# Patient Record
Sex: FEMALE | Race: BLACK OR AFRICAN AMERICAN | Employment: FULL TIME | URBAN - METROPOLITAN AREA
[De-identification: names, ages, dates, MRNs, and addresses within clinical notes are randomized per-mention and may not be internally consistent; named-entity substitution may affect disease eponyms.]

---

## 2019-01-10 ENCOUNTER — HOSPITAL ENCOUNTER (OUTPATIENT)
Dept: LAB | Age: 24
Discharge: HOME | End: 2019-01-10

## 2019-01-10 ENCOUNTER — HOSPITAL ENCOUNTER (EMERGENCY)
Age: 24
Discharge: HOME | End: 2019-01-10
Attending: EMERGENCY MEDICINE
Payer: COMMERCIAL

## 2019-01-10 ENCOUNTER — OFFICE VISIT (OUTPATIENT)
Dept: ORTHOPEDIC SURGERY | Facility: CLINIC | Age: 24
End: 2019-01-10

## 2019-01-10 VITALS
DIASTOLIC BLOOD PRESSURE: 76 MMHG | TEMPERATURE: 99.6 F | SYSTOLIC BLOOD PRESSURE: 114 MMHG | HEART RATE: 87 BPM | OXYGEN SATURATION: 100 % | RESPIRATION RATE: 18 BRPM

## 2019-01-10 VITALS
SYSTOLIC BLOOD PRESSURE: 107 MMHG | DIASTOLIC BLOOD PRESSURE: 70 MMHG | BODY MASS INDEX: 27.18 KG/M2 | RESPIRATION RATE: 16 BRPM | HEIGHT: 64 IN | OXYGEN SATURATION: 100 % | TEMPERATURE: 96.5 F | HEART RATE: 84 BPM | WEIGHT: 159.2 LBS

## 2019-01-10 DIAGNOSIS — M25.461 EFFUSION OF RIGHT KNEE: Primary | ICD-10-CM

## 2019-01-10 DIAGNOSIS — M25.461 EFFUSION OF RIGHT KNEE: ICD-10-CM

## 2019-01-10 DIAGNOSIS — M65.9 SYNOVITIS OF RIGHT KNEE: ICD-10-CM

## 2019-01-10 DIAGNOSIS — M25.561 RIGHT KNEE PAIN, UNSPECIFIED CHRONICITY: ICD-10-CM

## 2019-01-10 PROCEDURE — 89060 EXAM SYNOVIAL FLUID CRYSTALS: CPT

## 2019-01-10 PROCEDURE — 99001 SPECIMEN HANDLING PT-LAB: CPT

## 2019-01-10 PROCEDURE — 99283 EMERGENCY DEPT VISIT LOW MDM: CPT

## 2019-01-10 PROCEDURE — 74011250637 HC RX REV CODE- 250/637: Performed by: EMERGENCY MEDICINE

## 2019-01-10 RX ORDER — HYDROCODONE BITARTRATE AND ACETAMINOPHEN 5; 325 MG/1; MG/1
1 TABLET ORAL
Status: COMPLETED | OUTPATIENT
Start: 2019-01-10 | End: 2019-01-10

## 2019-01-10 RX ORDER — IBUPROFEN 400 MG/1
800 TABLET ORAL
Status: DISCONTINUED | OUTPATIENT
Start: 2019-01-10 | End: 2019-01-10

## 2019-01-10 RX ORDER — IBUPROFEN 800 MG/1
800 TABLET ORAL EVERY 8 HOURS
Qty: 15 TAB | Refills: 0 | Status: SHIPPED | OUTPATIENT
Start: 2019-01-10 | End: 2019-01-15

## 2019-01-10 RX ORDER — HYDROCODONE BITARTRATE AND ACETAMINOPHEN 5; 325 MG/1; MG/1
TABLET ORAL
COMMUNITY
End: 2019-03-06 | Stop reason: ALTCHOICE

## 2019-01-10 RX ORDER — BUPIVACAINE HYDROCHLORIDE 2.5 MG/ML
4 INJECTION, SOLUTION EPIDURAL; INFILTRATION; INTRACAUDAL ONCE
Qty: 4 ML | Refills: 0
Start: 2019-01-10 | End: 2019-01-10

## 2019-01-10 RX ORDER — HYDROCODONE BITARTRATE AND ACETAMINOPHEN 5; 325 MG/1; MG/1
TABLET ORAL
Qty: 6 TAB | Refills: 0 | Status: SHIPPED | OUTPATIENT
Start: 2019-01-10 | End: 2019-03-06 | Stop reason: ALTCHOICE

## 2019-01-10 RX ORDER — IBUPROFEN 800 MG/1
TABLET ORAL
COMMUNITY

## 2019-01-10 RX ORDER — BETAMETHASONE SODIUM PHOSPHATE AND BETAMETHASONE ACETATE 3; 3 MG/ML; MG/ML
6 INJECTION, SUSPENSION INTRA-ARTICULAR; INTRALESIONAL; INTRAMUSCULAR; SOFT TISSUE ONCE
Qty: 0.5 ML | Refills: 0
Start: 2019-01-10 | End: 2019-01-10

## 2019-01-10 RX ADMIN — IBUPROFEN 800 MG: 400 TABLET, FILM COATED ORAL at 07:37

## 2019-01-10 RX ADMIN — HYDROCODONE BITARTRATE AND ACETAMINOPHEN 1 TABLET: 5; 325 TABLET ORAL at 07:38

## 2019-01-10 NOTE — LETTER
NOTIFICATION RETURN TO WORK / SCHOOL 
 
1/10/2019 7:44 AM 
 
Ms. Claudio Whaley [address] [facility] [address] To Whom It May Concern: 
 
Claudio Whaley is currently under the care of [facility] EMERGENCY DEPT. She will return to work/school on: 1/13/19. If there are questions or concerns please have the patient contact our office.  
 
 
 
Sincerely, 
 
 
Larry Varma PA-C

## 2019-01-10 NOTE — ED NOTES
Report received from Veronica Schwarz RN. Assumed care of the patient. Received patient resting on stretcher, awake, alert, oriented X 3. Introduced myself as her primary nurse. Explanation of plan of care provided to the patient.

## 2019-01-10 NOTE — ED NOTES
Discharge instructions given to patient by Fabio Ruiz RN. Patient discharged home ambulatory, in stable condition.

## 2019-01-10 NOTE — ED PROVIDER NOTES
EMERGENCY DEPARTMENT HISTORY AND PHYSICAL EXAM 
 
Date: 1/10/2019 Patient Name: Radha Lynos History of Presenting Illness No chief complaint on file. History Provided By: Patient and auntie Chief Complaint: painful knee swelling Duration: 1 Days Timing:  Acute Location: right Quality: Aching and Pressure Severity: Moderate Modifying Factors: h/o same Associated Symptoms: denies any other associated signs or symptoms Additional History (Context): Radha Lyons is a 20 y.o. female with No significant past medical history who presents with swelling to her right knee since yesterday. This has happened 3x in past 6mos. Just moved to area to be w/family. Doesn't have PCP or orthopedic. Denies trauma, h/o gout. Has ace bandages to compress. Applying ice at home. PCP: None Current Facility-Administered Medications Medication Dose Route Frequency Provider Last Rate Last Dose  
 HYDROcodone-acetaminophen (NORCO) 5-325 mg per tablet 1 Tab  1 Tab Oral NOW Mi Belcher PA      
 ibuprofen (MOTRIN) tablet 800 mg  800 mg Oral NOW Mi Belcher PA Current Outpatient Medications Medication Sig Dispense Refill  ibuprofen (MOTRIN) 800 mg tablet Take 1 Tab by mouth every eight (8) hours for 5 days. 15 Tab 0  
 HYDROcodone-acetaminophen (NORCO) 5-325 mg per tablet Take 1-2 tablets PO every 4-6 hours as needed for pain control. If over the counter ibuprofen or acetaminophen was suggested, then only take the vicodin for pain not well controlled with the over the counter medication. 6 Tab 0 Past History Past Medical History: No past medical history on file. Past Surgical History: No past surgical history on file. Family History: No family history on file. Social History: 
Social History Tobacco Use  Smoking status: Not on file Substance Use Topics  Alcohol use: Not on file  Drug use: Not on file Allergies: Allergies not on file Review of Systems Review of Systems Constitutional: Negative for fever. Musculoskeletal: Positive for arthralgias and joint swelling. Neurological: Negative for weakness and numbness. All other systems reviewed and are negative. All Other Systems Negative Physical Exam  
There were no vitals filed for this visit. Physical Exam  
Constitutional: She is oriented to person, place, and time. She appears well-developed. HENT:  
Head: Normocephalic and atraumatic. Eyes: Pupils are equal, round, and reactive to light. Neck: No JVD present. No tracheal deviation present. No thyromegaly present. Cardiovascular: Normal rate, regular rhythm and normal heart sounds. Exam reveals no gallop and no friction rub. No murmur heard. Pulmonary/Chest: Effort normal and breath sounds normal. No stridor. No respiratory distress. She has no wheezes. She has no rales. She exhibits no tenderness. Abdominal: Soft. She exhibits no distension and no mass. There is no tenderness. There is no rebound and no guarding. Musculoskeletal: She exhibits edema and tenderness. Right knee: large tender effusion. No warmth or redness. Lymphadenopathy:  
  She has no cervical adenopathy. Neurological: She is alert and oriented to person, place, and time. Skin: Skin is warm and dry. No rash noted. No erythema. No pallor. Psychiatric: She has a normal mood and affect. Her behavior is normal. Thought content normal.  
Nursing note and vitals reviewed. Diagnostic Study Results Labs - No results found for this or any previous visit (from the past 12 hour(s)). Radiologic Studies - No orders to display CT Results  (Last 48 hours) None CXR Results  (Last 48 hours) None Medical Decision Making I am the first provider for this patient.  
 
I reviewed the vital signs, available nursing notes, past medical history, past surgical history, family history and social history. Vital Signs-Reviewed the patient's vital signs. Records Reviewed: Nursing Notes Procedures: 
Procedures Provider Notes (Medical Decision Making): treat pain; not septic jt appearing. H/o similar effusion now x3. Refer to ortho. MED RECONCILIATION: 
Current Facility-Administered Medications Medication Dose Route Frequency  HYDROcodone-acetaminophen (NORCO) 5-325 mg per tablet 1 Tab  1 Tab Oral NOW  ibuprofen (MOTRIN) tablet 800 mg  800 mg Oral NOW Current Outpatient Medications Medication Sig  ibuprofen (MOTRIN) 800 mg tablet Take 1 Tab by mouth every eight (8) hours for 5 days.  HYDROcodone-acetaminophen (NORCO) 5-325 mg per tablet Take 1-2 tablets PO every 4-6 hours as needed for pain control. If over the counter ibuprofen or acetaminophen was suggested, then only take the vicodin for pain not well controlled with the over the counter medication. Disposition: 
home DISCHARGE NOTE:  
7:19 AM 
 
Pt has been reexamined. Patient has no new complaints, changes, or physical findings. Care plan outlined and precautions discussed. Results of exam were reviewed with the patient. All medications were reviewed with the patient; will d/c home with ibuprofen, vicodin. All of pt's questions and concerns were addressed. Patient was instructed and agrees to follow up with ortho, as well as to return to the ED upon further deterioration. Patient is ready to go home. Follow-up Information Follow up With Specialties Details Why Contact Info Sukhwinder Rodrigues MD Orthopedic Surgery Schedule an appointment as soon as possible for a visit today  [address] [address] 
[phone] [address] [employer].  Schedule an appointment as soon as possible for a visit today  [address] [place] [address], [address] [facility] 
[phone] [facility] EMERGENCY DEPT Emergency Medicine   [address] [address] 
[phone] [facility] EMERGENCY DEPT Emergency Medicine  If symptoms worsen return immediately [address] Current Discharge Medication List  
  
START taking these medications Details  
ibuprofen (MOTRIN) 800 mg tablet Take 1 Tab by mouth every eight (8) hours for 5 days. Qty: 15 Tab, Refills: 0 HYDROcodone-acetaminophen (NORCO) 5-325 mg per tablet Take 1-2 tablets PO every 4-6 hours as needed for pain control. If over the counter ibuprofen or acetaminophen was suggested, then only take the vicodin for pain not well controlled with the over the counter medication. Qty: 6 Tab, Refills: 0 Associated Diagnoses: Effusion of right knee Diagnosis Clinical Impression: 1. Effusion of right knee

## 2019-01-10 NOTE — LETTER
NOTIFICATION RETURN TO WORK / SCHOOL 
 
1/10/2019 3:43 PM 
 
Ms. Radha Howell [address] [address] To Whom It May Concern: 
 
Radha Howell is currently under the care of [address] [place] [address]. She will return to full duty work/school on: Tuesday, January 15, 2019. If there are questions or concerns please have the patient contact our office.  
 
 
 
Sincerely, 
 
 
 
Linn Moffett MD

## 2019-01-10 NOTE — PROGRESS NOTES
Patient: Harlow Goltz                MRN: [id number]       SSN: [id number] YOB: 1995        AGE: 20 y.o. SEX: female PCP: No primary care provider on file. 01/10/19 Chief Complaint Patient presents with  Knee Pain Right HISTORY:  Harlow Goltz is a 20 y.o. female who is seen for right knee pain. She has been experiencing decreasing knee pain for the past 3 months. . She reports no h/o injury. She experienced severe sudden swelling of her right knee. She notes pain with standing and walking. She has pain ascending/descending stairs. She reports difficulty with everyday activities. She has difficulty walking and has been using a four point cane. She was seen at [facility] this morning 5am for severe right knee pain and swelling. This is the third episode of acute right knee swelling in the past year. Her swelling usually decreases within a few days. No known family h/o gout but she doesn't know her biological parents' medical history well. Pain Assessment  1/10/2019 Location of Pain Knee Location Modifiers Right Severity of Pain 6 Quality of Pain Throbbing;Aching Duration of Pain Persistent Frequency of Pain Constant Aggravating Factors Walking;Standing Limiting Behavior Yes Relieving Factors Ice;Elevation Result of Injury No  
 
Occupation, etc:  Ms. Charisse Liu is a  for [employer]. She previously lived in [place] and in [place]. She lives in [facility] with her aunt and uncle. Her biological dad lives in [place] and her biological mom lives in [place]. Her aunt had guardianship of her growing up. Current weight is 159 pounds. She is 5'4\" tall. No results found for: HBA1C, HGBE8, AJN1HZCP, [id number], [id number] Weight Metrics 1/10/2019 Weight 159 lb 3.2 oz  
BMI 27.33 kg/m2 There is no problem list on file for this patient. REVIEW OF SYSTEMS: All Below are Negative except: See HPI Constitutional: negative for fever, chills, and weight loss. Cardiovascular: negative for chest pain, claudication, leg swelling, SOB, RYAN Gastrointestinal: Negative for pain, N/V/C/D, Blood in stool or urine, dysuria,  hematuria, incontinence, pelvic pain. Musculoskeletal: See HPI Neurological: Negative for dizziness and weakness. Negative for headaches, Visual changes, confusion, seizures Phychiatric/Behavioral: Negative for depression, memory loss, substance  abuse. Extremities: Negative for hair changes, rash, or skin lesion changes. Hematologic: Negative for bleeding problems, bruising, pallor or swollen lymph  nodes Peripheral Vascular: No calf pain, no circulation deficits. Social History Socioeconomic History  Marital status: UNKNOWN Spouse name: Not on file  Number of children: Not on file  Years of education: Not on file  Highest education level: Not on file Social Needs  Financial resource strain: Not on file  Food insecurity - worry: Not on file  Food insecurity - inability: Not on file  Transportation needs - medical: Not on file  Transportation needs - non-medical: Not on file Occupational History  Not on file Tobacco Use  Smoking status: Never Smoker  Smokeless tobacco: Never Used Substance and Sexual Activity  Alcohol use: Yes Comment: socially  Drug use: No  
 Sexual activity: Not on file Other Topics Concern  Not on file Social History Narrative  Not on file Allergies Allergen Reactions  Shellfish Derived Swelling Pt has facial swelling Current Outpatient Medications Medication Sig  ibuprofen (MOTRIN) 800 mg tablet Take  by mouth.  HYDROcodone-acetaminophen (NORCO) 5-325 mg per tablet Take  by mouth. No current facility-administered medications for this visit. PHYSICAL EXAMINATION: 
Visit Vitals /70 Pulse 84 Temp 96.5 °F (35.8 °C) (Oral) Resp 16 Ht 5' 4\" (1.626 m) Wt 159 lb 3.2 oz (72.2 kg) SpO2 100% BMI 27.33 kg/m² ORTHO EXAMINATION: 
Examination Right knee Left knee Skin Intact Intact Range of motion 40-20 120-0 Effusion +3 - Medial joint line tenderness ++ - Lateral joint line tenderness ++ -  
Popliteal tenderness - -  
Osteophytes palpable - - Felicias - - Patella crepitus - - Anterior drawer - - Lateral laxity - - Medial laxity - - Varus deformity - -  
Valgus deformity - - Pretibial edema - - Calf tenderness - - PROCEDURE:  After timeout and under sterile conditions, right knee aspirated 100 cc of slightly blood tinged slightly cloudy and turbid fluid. The fluid was sent to the lab for culture and crystal analysis. After discussing treatment options, patient's right knee was injected with 4 cc Marcaine and 1/2 cc Celestone. RADIOGRAPHS: 
XR RIGHT KNEE 1/10/19 AIYANA IMPRESSION:  Three views - No fractures, prepatellar effusion, no joint space narrowing, no osteophytes present. IKDC Grade A IMPRESSION:   
  ICD-10-CM ICD-9-CM 1. Effusion of right knee M25.461 719.06 betamethasone (CELESTONE SOLUSPAN) 6 mg/mL injection BETAMETHASONE ACETATE & SODIUM PHOSPHATE INJECTION 3 MG EA.  
   DRAIN/INJECT LARGE JOINT/BURSA  
   bupivacaine, PF, (MARCAINE, PF,) 0.25 % (2.5 mg/mL) injection CRYSTALS, SYNOVIAL FLUID  
   CULTURE, BODY FLUID W GRAM STAIN 2. Right knee pain, unspecified chronicity M25.561 719.46 AMB POC X-RAY KNEE 3 VIEW  
   betamethasone (CELESTONE SOLUSPAN) 6 mg/mL injection BETAMETHASONE ACETATE & SODIUM PHOSPHATE INJECTION 3 MG EA.  
   DRAIN/INJECT LARGE JOINT/BURSA  
   bupivacaine, PF, (MARCAINE, PF,) 0.25 % (2.5 mg/mL) injection CRYSTALS, SYNOVIAL FLUID  
   CULTURE, BODY FLUID W GRAM STAIN 3. Synovitis of right knee M65.9 727.09 betamethasone (CELESTONE SOLUSPAN) 6 mg/mL injection BETAMETHASONE ACETATE & SODIUM PHOSPHATE INJECTION 3 MG EA.  
   DRAIN/INJECT LARGE JOINT/BURSA  
   bupivacaine, PF, (MARCAINE, PF,) 0.25 % (2.5 mg/mL) injection CRYSTALS, SYNOVIAL FLUID  
   CULTURE, BODY FLUID W GRAM STAIN  
 
PLAN:  After timeout and under sterile conditions, right knee aspirated 100 cc of slightly blood tinged cloudy and turbid fluid. The fluid was sent to the lab for culture and crystal analysis. After discussing treatment options, patient's right knee was injected with 4 cc Marcaine and 1/2 cc Celestone. There is no need for surgery at this time. She will follow up on Monday.    
 
Scribed by Srinath Wilson ([address]) as dictated by Shantel Luo MD

## 2019-01-11 LAB
BODY FLD TYPE: NORMAL
CRYSTALS FLD MICRO: NORMAL
XX-LABCORP SPECIMEN COL,LCBCF: NORMAL

## 2019-01-14 ENCOUNTER — OFFICE VISIT (OUTPATIENT)
Dept: ORTHOPEDIC SURGERY | Facility: CLINIC | Age: 24
End: 2019-01-14

## 2019-01-14 VITALS
SYSTOLIC BLOOD PRESSURE: 119 MMHG | HEIGHT: 64 IN | OXYGEN SATURATION: 100 % | BODY MASS INDEX: 27.31 KG/M2 | HEART RATE: 78 BPM | TEMPERATURE: 97.6 F | WEIGHT: 160 LBS | DIASTOLIC BLOOD PRESSURE: 77 MMHG | RESPIRATION RATE: 16 BRPM

## 2019-01-14 DIAGNOSIS — M25.461 EFFUSION OF RIGHT KNEE: ICD-10-CM

## 2019-01-14 DIAGNOSIS — M25.561 RIGHT KNEE PAIN, UNSPECIFIED CHRONICITY: Primary | ICD-10-CM

## 2019-01-14 DIAGNOSIS — M65.9 SYNOVITIS OF RIGHT KNEE: ICD-10-CM

## 2019-01-14 NOTE — PROGRESS NOTES
Patient: Marshia Goltz                MRN: [id number]       SSN: [id number] YOB: 1995        AGE: 20 y.o. SEX: female PCP: None 01/14/19 CC: RIGHT KNEE PAIN AND SWELLING 
 
HISTORY:  Marshia Goltz is a 20 y.o. female who is seen for right knee pain. She has been experiencing significantly less pain and swelling since aspiration last OV. Prior to taht she had been experiencing knee pain for the past 3 months. She reports no h/o injury. She experienced severe sudden swelling of her right knee prior to her last ov. She notes pain with standing and walking. She has pain ascending/descending stairs. She reports difficulty with everyday activities. She has difficulty walking and has been using a four point cane. She was seen at [facility] this morning 5am for severe right knee pain and swelling. This is the third episode of acute right knee swelling in the past year. Her swelling usually decreases within a few days. No known family h/o gout but she doesn't know her biological parents' medical history well. Pain Assessment  1/14/2019 Location of Pain Knee Location Modifiers Right Severity of Pain 0 Quality of Pain - Duration of Pain - Frequency of Pain - Aggravating Factors - Limiting Behavior -  
Relieving Factors - Result of Injury - Occupation, etc:  Ms. Ej Rice is a  for [employer]. She previously lived in [place] and in [place]. She lives in [place] with her aunt and uncle. Her biological dad lives in [place] and her biological mom lives in [place]. Her aunt had guardianship of her growing up. Current weight is 159 pounds. She is 5'4\" tall. No results found for: HBA1C, HGBE8, LNP5WYXD, BSZ2VXEX, AKF2FVMT Weight Metrics 1/14/2019 1/10/2019 Weight 160 lb 159 lb 3.2 oz  
BMI 27.46 kg/m2 27.33 kg/m2 There is no problem list on file for this patient. REVIEW OF SYSTEMS: All Below are Negative except: See HPI Constitutional: negative for fever, chills, and weight loss. Cardiovascular: negative for chest pain, claudication, leg swelling, SOB, RYAN Gastrointestinal: Negative for pain, N/V/C/D, Blood in stool or urine, dysuria,  hematuria, incontinence, pelvic pain. Musculoskeletal: See HPI Neurological: Negative for dizziness and weakness. Negative for headaches, Visual changes, confusion, seizures Phychiatric/Behavioral: Negative for depression, memory loss, substance  abuse. Extremities: Negative for hair changes, rash, or skin lesion changes. Hematologic: Negative for bleeding problems, bruising, pallor or swollen lymph  nodes Peripheral Vascular: No calf pain, no circulation deficits. Social History Socioeconomic History  Marital status: UNKNOWN Spouse name: Not on file  Number of children: Not on file  Years of education: Not on file  Highest education level: Not on file Social Needs  Financial resource strain: Not on file  Food insecurity - worry: Not on file  Food insecurity - inability: Not on file  Transportation needs - medical: Not on file  Transportation needs - non-medical: Not on file Occupational History  Not on file Tobacco Use  Smoking status: Never Smoker  Smokeless tobacco: Never Used Substance and Sexual Activity  Alcohol use: Yes Comment: socially  Drug use: No  
 Sexual activity: Not on file Other Topics Concern  Not on file Social History Narrative  Not on file Allergies Allergen Reactions  Shellfish Derived Hives  Shellfish Derived Swelling Pt has facial swelling Current Outpatient Medications Medication Sig  ibuprofen (MOTRIN) 800 mg tablet Take 1 Tab by mouth every eight (8) hours for 5 days.  ibuprofen (MOTRIN) 800 mg tablet Take  by mouth.   
 HYDROcodone-acetaminophen (NORCO) 5-325 mg per tablet Take 1-2 tablets PO every 4-6 hours as needed for pain control. If over the counter ibuprofen or acetaminophen was suggested, then only take the vicodin for pain not well controlled with the over the counter medication.  HYDROcodone-acetaminophen (NORCO) 5-325 mg per tablet Take  by mouth. No current facility-administered medications for this visit. PHYSICAL EXAMINATION: 
Visit Vitals /77 (BP 1 Location: Left arm, BP Patient Position: Sitting) Pulse 78 Temp 97.6 °F (36.4 °C) (Oral) Resp 16 Ht 5' 4\" (1.626 m) Wt 160 lb (72.6 kg) LMP 01/04/2019 (Exact Date) SpO2 100% BMI 27.46 kg/m² ORTHO EXAMINATION: 
Examination Right knee Left knee Skin Intact Intact Range of motion 110-5 120-0 Effusion +1 - Medial joint line tenderness - - Lateral joint line tenderness - - Popliteal tenderness - -  
Osteophytes palpable - - Felicias - - Patella crepitus - - Anterior drawer - - Lateral laxity - - Medial laxity - - Varus deformity - -  
Valgus deformity - - Pretibial edema - - Calf tenderness - -  
 
RADIOGRAPHS: 
XR RIGHT KNEE 1/10/19 AIYANA IMPRESSION:  Three views - No fractures, prepatellar effusion, no joint space narrowing, no osteophytes present. IKDC Grade A IMPRESSION:   
  ICD-10-CM ICD-9-CM 1. Right knee pain, unspecified chronicity M25.561 719.46   
2. Synovitis of right knee M65.9 727.09   
3. Effusion of right knee M25.461 719.06 PLAN:  There is no need for surgery at this time. She will follow up as needed if swelling returns. We discussed possible need for a rheumatology workup at some time in the future if pain and swelling continues.  
.   
 
Scribed by Mera Amaya ([address]) as dictated by Steve King MD

## 2019-03-06 ENCOUNTER — OFFICE VISIT (OUTPATIENT)
Dept: OBGYN CLINIC | Age: 24
End: 2019-03-06

## 2019-03-06 VITALS
BODY MASS INDEX: 26.94 KG/M2 | HEIGHT: 64 IN | HEART RATE: 94 BPM | OXYGEN SATURATION: 100 % | SYSTOLIC BLOOD PRESSURE: 112 MMHG | WEIGHT: 157.8 LBS | RESPIRATION RATE: 20 BRPM | DIASTOLIC BLOOD PRESSURE: 70 MMHG

## 2019-03-06 DIAGNOSIS — Z30.09 FAMILY PLANNING: ICD-10-CM

## 2019-03-06 DIAGNOSIS — Z01.419 WELL WOMAN EXAM WITH ROUTINE GYNECOLOGICAL EXAM: Primary | ICD-10-CM

## 2019-03-06 RX ORDER — NORETHINDRONE ACETATE AND ETHINYL ESTRADIOL 1MG-20(21)
1 KIT ORAL DAILY
Qty: 3 PACKAGE | Refills: 3 | Status: SHIPPED | OUTPATIENT
Start: 2019-03-06 | End: 2019-12-16 | Stop reason: SDUPTHER

## 2019-03-06 NOTE — PROGRESS NOTES
1. Have you been to the ER, urgent care clinic since your last visit? Hospitalized since your last visit? No    2. Have you seen or consulted any other health care providers outside of the [address] [place] [address] since your last visit? Include any pap smears or colon screening.  No

## 2019-03-06 NOTE — PROGRESS NOTES
Subjective:   20 y.o. female for Well Woman Check. Patient's last menstrual period was 03/06/2019. Social History: single partner, contraception - none. Pertinent past medical hstory: no history of HTN, DVT, CAD, DM, liver disease, migraines or smoking. There is no problem list on file for this patient. There are no active problems to display for this patient. Current Outpatient Medications   Medication Sig Dispense Refill    norethindrone-ethinyl estradiol (JUNEL FE 1/20) 1 mg-20 mcg (21)/75 mg (7) tab Take 1 Tab by mouth daily. 3 Package 3    ibuprofen (MOTRIN) 800 mg tablet Take  by mouth. Allergies   Allergen Reactions    Shellfish Derived Hives    Shellfish Derived Swelling     Pt has facial swelling     Past Medical History:   Diagnosis Date    Ovarian cyst      History reviewed. No pertinent surgical history. History reviewed. No pertinent family history. Social History     Tobacco Use    Smoking status: Never Smoker    Smokeless tobacco: Never Used   Substance Use Topics    Alcohol use: Yes     Comment: socially        ROS:  Feeling well. No dyspnea or chest pain on exertion. No abdominal pain, change in bowel habits, black or bloody stools. No urinary tract symptoms. GYN ROS: normal menses, no abnormal bleeding, pelvic pain or discharge, no breast pain or new or enlarging lumps on self exam. No neurological complaints. Objective:     Visit Vitals  /70   Pulse 94   Resp 20   Ht 5' 4\" (1.626 m)   Wt 157 lb 12.8 oz (71.6 kg)   LMP 03/06/2019   SpO2 100%   BMI 27.09 kg/m²     The patient appears well, alert, oriented x 3, in no distress. ENT normal.  Neck supple. No adenopathy or thyromegaly. DIANA. Lungs are clear, good air entry, no wheezes, rhonchi or rales. S1 and S2 normal, no murmurs, regular rate and rhythm. Abdomen soft without tenderness, guarding, mass or organomegaly. Extremities show no edema, normal peripheral pulses.  Neurological is normal, no focal findings. BREAST EXAM: breasts appear normal, no suspicious masses, no skin or nipple changes or axillary nodes    PELVIC EXAM: normal external genitalia, vulva, vagina, cervix, uterus and adnexa    Assessment/Plan:   well woman  no contraindication to begin hormonal therapy  pap smear  return annually or prn    ICD-10-CM ICD-9-CM    1. Well woman exam with routine gynecological exam Z01.419 V72.31 PAP IG, CT-NG-TV, RFX APTIMA HPV ASCUS ([id number])      norethindrone-ethinyl estradiol (JUNEL FE 1/20) 1 mg-20 mcg (21)/75 mg (7) tab      T PALLIDUM AB      HEP B SURFACE AG      HEPATITIS C AB      HIV 1/2 AG/AB, 4TH GENERATION,W RFLX CONFIRM      AMB POC URINE PREGNANCY TEST, VISUAL COLOR COMPARISON      HIV 1/2 AG/AB, 4TH GENERATION,W RFLX CONFIRM      HEPATITIS C AB      HEP B SURFACE AG      T PALLIDUM AB   2. Family planning Z30.09 V25.09 PAP IG, CT-NG-TV, RFX APTIMA HPV ASCUS ([id number])      norethindrone-ethinyl estradiol (JUNEL FE 1/20) 1 mg-20 mcg (21)/75 mg (7) tab      T PALLIDUM AB      HEP B SURFACE AG      HEPATITIS C AB      HIV 1/2 AG/AB, 4TH GENERATION,W RFLX CONFIRM      AMB POC URINE PREGNANCY TEST, VISUAL COLOR COMPARISON      HIV 1/2 AG/AB, 4TH GENERATION,W RFLX CONFIRM      HEPATITIS C AB      HEP B SURFACE AG      T PALLIDUM AB     Encounter Diagnoses   Name Primary?  Well woman exam with routine gynecological exam Yes    Family planning      Orders Placed This Encounter    T PALLIDUM AB    HEP B SURFACE AG    HEPATITIS C AB    HIV 1/2 AG/AB, 4TH GENERATION,W RFLX CONFIRM    AMB POC URINE PREGNANCY TEST, VISUAL COLOR COMPARISON    norethindrone-ethinyl estradiol (JUNEL FE 1/20) 1 mg-20 mcg (21)/75 mg (7) tab    PAP IG, CT-NG-TV, RFX APTIMA HPV ASCUS ([id number])     Follow-up Disposition:  Return in about 1 year (around 3/6/2020) for Annual Exam or prn. Ji Mathew

## 2019-03-07 LAB
HCG URINE, QL. (POC): NEGATIVE
VALID INTERNAL CONTROL?: YES

## 2019-03-08 LAB
C TRACH RRNA CVX QL NAA+PROBE: NEGATIVE
CYTOLOGIST CVX/VAG CYTO: NORMAL
CYTOLOGY CVX/VAG DOC THIN PREP: NORMAL
DX ICD CODE: NORMAL
HBV SURFACE AG SERPL QL IA: NEGATIVE
HCV AB S/CO SERPL IA: 0.5 S/CO RATIO (ref 0–0.9)
HIV 1+2 AB+HIV1 P24 AG SERPL QL IA: NON REACTIVE
LABCORP, 190119: NORMAL
Lab: NORMAL
N GONORRHOEA RRNA CVX QL NAA+PROBE: NEGATIVE
OTHER STN SPEC: NORMAL
PATH REPORT.FINAL DX SPEC: NORMAL
STAT OF ADQ CVX/VAG CYTO-IMP: NORMAL
T PALLIDUM AB SER QL IA: NEGATIVE
T VAGINALIS RRNA SPEC QL NAA+PROBE: NEGATIVE

## 2019-08-10 ENCOUNTER — HOSPITAL ENCOUNTER (EMERGENCY)
Age: 24
Discharge: HOME | End: 2019-08-10
Attending: EMERGENCY MEDICINE
Payer: COMMERCIAL

## 2019-08-10 VITALS
TEMPERATURE: 98.3 F | SYSTOLIC BLOOD PRESSURE: 115 MMHG | RESPIRATION RATE: 10 BRPM | HEART RATE: 78 BPM | DIASTOLIC BLOOD PRESSURE: 72 MMHG | OXYGEN SATURATION: 100 %

## 2019-08-10 DIAGNOSIS — E87.6 HYPOKALEMIA: ICD-10-CM

## 2019-08-10 DIAGNOSIS — R25.2 MUSCLE CRAMPS: Primary | ICD-10-CM

## 2019-08-10 LAB
ALBUMIN SERPL-MCNC: 4 G/DL (ref 3.4–5)
ALBUMIN/GLOB SERPL: 1 {RATIO} (ref 0.8–1.7)
ALP SERPL-CCNC: 38 U/L (ref 45–117)
ALT SERPL-CCNC: 16 U/L (ref 13–56)
ANION GAP SERPL CALC-SCNC: 8 MMOL/L (ref 3–18)
AST SERPL-CCNC: 12 U/L (ref 10–38)
BASOPHILS # BLD: 0 K/UL (ref 0–0.1)
BASOPHILS NFR BLD: 0 % (ref 0–2)
BILIRUB SERPL-MCNC: 0.5 MG/DL (ref 0.2–1)
BUN SERPL-MCNC: 12 MG/DL (ref 7–18)
BUN/CREAT SERPL: 15 (ref 12–20)
CALCIUM SERPL-MCNC: 9.3 MG/DL (ref 8.5–10.1)
CHLORIDE SERPL-SCNC: 108 MMOL/L (ref 100–111)
CO2 SERPL-SCNC: 23 MMOL/L (ref 21–32)
CREAT SERPL-MCNC: 0.79 MG/DL (ref 0.6–1.3)
DIFFERENTIAL METHOD BLD: ABNORMAL
EOSINOPHIL # BLD: 0 K/UL (ref 0–0.4)
EOSINOPHIL NFR BLD: 0 % (ref 0–5)
ERYTHROCYTE [DISTWIDTH] IN BLOOD BY AUTOMATED COUNT: 13.5 % (ref 11.6–14.5)
GLOBULIN SER CALC-MCNC: 4.2 G/DL (ref 2–4)
GLUCOSE BLD STRIP.AUTO-MCNC: 91 MG/DL (ref 70–110)
GLUCOSE SERPL-MCNC: 85 MG/DL (ref 74–99)
HCG SERPL QL: NEGATIVE
HCT VFR BLD AUTO: 37.9 % (ref 35–45)
HGB BLD-MCNC: 12.8 G/DL (ref 12–16)
LIPASE SERPL-CCNC: 76 U/L (ref 73–393)
LYMPHOCYTES # BLD: 0.9 K/UL (ref 0.9–3.6)
LYMPHOCYTES NFR BLD: 11 % (ref 21–52)
MAGNESIUM SERPL-MCNC: 1.9 MG/DL (ref 1.6–2.6)
MCH RBC QN AUTO: 26.2 PG (ref 24–34)
MCHC RBC AUTO-ENTMCNC: 33.8 G/DL (ref 31–37)
MCV RBC AUTO: 77.7 FL (ref 74–97)
MONOCYTES # BLD: 0.3 K/UL (ref 0.05–1.2)
MONOCYTES NFR BLD: 3 % (ref 3–10)
NEUTS SEG # BLD: 7 K/UL (ref 1.8–8)
NEUTS SEG NFR BLD: 86 % (ref 40–73)
PLATELET # BLD AUTO: 303 K/UL (ref 135–420)
PMV BLD AUTO: 9.4 FL (ref 9.2–11.8)
POTASSIUM SERPL-SCNC: 3.4 MMOL/L (ref 3.5–5.5)
PROT SERPL-MCNC: 8.2 G/DL (ref 6.4–8.2)
RBC # BLD AUTO: 4.88 M/UL (ref 4.2–5.3)
SODIUM SERPL-SCNC: 139 MMOL/L (ref 136–145)
WBC # BLD AUTO: 8.2 K/UL (ref 4.6–13.2)

## 2019-08-10 PROCEDURE — 96376 TX/PRO/DX INJ SAME DRUG ADON: CPT

## 2019-08-10 PROCEDURE — 80053 COMPREHEN METABOLIC PANEL: CPT

## 2019-08-10 PROCEDURE — 82962 GLUCOSE BLOOD TEST: CPT

## 2019-08-10 PROCEDURE — 96361 HYDRATE IV INFUSION ADD-ON: CPT

## 2019-08-10 PROCEDURE — 84703 CHORIONIC GONADOTROPIN ASSAY: CPT

## 2019-08-10 PROCEDURE — 74011250636 HC RX REV CODE- 250/636: Performed by: EMERGENCY MEDICINE

## 2019-08-10 PROCEDURE — 99285 EMERGENCY DEPT VISIT HI MDM: CPT

## 2019-08-10 PROCEDURE — 85025 COMPLETE CBC W/AUTO DIFF WBC: CPT

## 2019-08-10 PROCEDURE — 96374 THER/PROPH/DIAG INJ IV PUSH: CPT

## 2019-08-10 PROCEDURE — 83690 ASSAY OF LIPASE: CPT

## 2019-08-10 PROCEDURE — 74011250637 HC RX REV CODE- 250/637: Performed by: EMERGENCY MEDICINE

## 2019-08-10 PROCEDURE — 83735 ASSAY OF MAGNESIUM: CPT

## 2019-08-10 RX ORDER — SODIUM CHLORIDE 9 MG/ML
100 INJECTION, SOLUTION INTRAVENOUS CONTINUOUS
Status: DISCONTINUED | OUTPATIENT
Start: 2019-08-10 | End: 2019-08-11 | Stop reason: HOSPADM

## 2019-08-10 RX ORDER — METAXALONE 800 MG/1
800 TABLET ORAL 4 TIMES DAILY
Qty: 20 TAB | Refills: 0 | Status: SHIPPED | OUTPATIENT
Start: 2019-08-10 | End: 2019-08-15

## 2019-08-10 RX ORDER — MIDAZOLAM HYDROCHLORIDE 1 MG/ML
1 INJECTION, SOLUTION INTRAMUSCULAR; INTRAVENOUS ONCE
Status: COMPLETED | OUTPATIENT
Start: 2019-08-10 | End: 2019-08-10

## 2019-08-10 RX ORDER — IBUPROFEN 800 MG/1
800 TABLET ORAL EVERY 8 HOURS
Qty: 15 TAB | Refills: 0 | Status: SHIPPED | OUTPATIENT
Start: 2019-08-10 | End: 2019-08-15

## 2019-08-10 RX ORDER — MIDAZOLAM HYDROCHLORIDE 1 MG/ML
2 INJECTION, SOLUTION INTRAMUSCULAR; INTRAVENOUS ONCE
Status: COMPLETED | OUTPATIENT
Start: 2019-08-10 | End: 2019-08-10

## 2019-08-10 RX ORDER — POTASSIUM CHLORIDE 20 MEQ/1
40 TABLET, EXTENDED RELEASE ORAL
Status: COMPLETED | OUTPATIENT
Start: 2019-08-10 | End: 2019-08-10

## 2019-08-10 RX ADMIN — SODIUM CHLORIDE 100 ML/HR: 900 INJECTION, SOLUTION INTRAVENOUS at 22:05

## 2019-08-10 RX ADMIN — MIDAZOLAM HYDROCHLORIDE 2 MG: 2 INJECTION, SOLUTION INTRAMUSCULAR; INTRAVENOUS at 22:05

## 2019-08-10 RX ADMIN — MIDAZOLAM HYDROCHLORIDE 1 MG: 2 INJECTION, SOLUTION INTRAMUSCULAR; INTRAVENOUS at 21:12

## 2019-08-10 RX ADMIN — SODIUM CHLORIDE 1000 ML: 900 INJECTION, SOLUTION INTRAVENOUS at 21:11

## 2019-08-10 RX ADMIN — POTASSIUM CHLORIDE 40 MEQ: 20 TABLET, EXTENDED RELEASE ORAL at 22:05

## 2019-08-10 NOTE — ED PROVIDER NOTES
EMERGENCY DEPARTMENT HISTORY AND PHYSICAL EXAM    Date: 8/10/2019  Patient Name: Jarret De Oliveira    History of Presenting Illness     Chief Complaint   Patient presents with    Abdominal Pain         History Provided By: Patient      Additional History (Context): Jarret De Oliveira is a 20 y.o. female with uterine fibroids and ovarian cyst who is having some menstrual cramping now. Is on her menstrual cycle and has had bleeding today. Use 4 pads today. Has been on Lupron and oral contraception for her ovarian cysts noted cramping today and feels as if she cannot control her hands or her breathing. Feels as if her speech is more difficult because it is difficult to open her mouth. Says she took Midol for relief. Current Facility-Administered Medications   Medication Dose Route Frequency Provider Last Rate Last Dose    0.9% sodium chloride infusion  100 mL/hr IntraVENous CONTINUOUS Mi Belcher  mL/hr at 08/10/19 2205 100 mL/hr at 08/10/19 2205     Current Outpatient Medications   Medication Sig Dispense Refill    ibuprofen (MOTRIN) 800 mg tablet Take 1 Tab by mouth every eight (8) hours for 5 days. 15 Tab 0    metaxalone (SKELAXIN) 800 mg tablet Take 1 Tab by mouth four (4) times daily for 5 days. 20 Tab 0    norethindrone-ethinyl estradiol (JUNEL FE 1/20) 1 mg-20 mcg (21)/75 mg (7) tab Take 1 Tab by mouth daily. 3 Package 3    ibuprofen (MOTRIN) 800 mg tablet Take  by mouth. Past History     Past Medical History:  Past Medical History:   Diagnosis Date    Ovarian cyst        Past Surgical History:  No past surgical history on file. Family History:  No family history on file. Social History:  Social History     Tobacco Use    Smoking status: Never Smoker    Smokeless tobacco: Never Used   Substance Use Topics    Alcohol use: Yes     Comment: socially    Drug use: No       Allergies:   Allergies   Allergen Reactions    Shellfish Derived Hives    Shellfish Derived Swelling Pt has facial swelling         Review of Systems   Review of Systems   Constitutional: Negative for fever. HENT: Negative. Eyes: Negative. Respiratory: Negative. Cardiovascular: Negative. Gastrointestinal: Negative. Endocrine: Negative. Genitourinary: Positive for vaginal bleeding. Musculoskeletal: Positive for myalgias. Skin: Negative. Allergic/Immunologic: Negative. Neurological: Negative. Hematological: Negative. Psychiatric/Behavioral: Negative. All Other Systems Negative  Physical Exam     Vitals:    08/10/19 1857 08/10/19 2001 08/10/19 2030 08/10/19 2230   BP: 136/69 130/73 143/83 114/75   Pulse: (!) 122 100 (!) 103 91   Resp: 20 18 24 14   Temp: 98.2 °F (36.8 °C) 98.7 °F (37.1 °C)     SpO2: 100% 100% 100% 100%     Physical Exam   Constitutional: She is oriented to person, place, and time. She appears well-developed. Quivering at the lips. HENT:   Head: Normocephalic and atraumatic. Eyes: Pupils are equal, round, and reactive to light. Neck: No JVD present. No tracheal deviation present. No thyromegaly present. Cardiovascular: Normal rate, regular rhythm and normal heart sounds. Exam reveals no gallop and no friction rub. No murmur heard. Pulmonary/Chest: Effort normal and breath sounds normal. No stridor. No respiratory distress. She has no wheezes. She has no rales. She exhibits no tenderness. Abdominal: Soft. She exhibits no distension and no mass. There is no tenderness. There is no rebound and no guarding. Musculoskeletal: She exhibits no edema or tenderness. Holding her hands at the wrists hyperflexed fingers stiff the positioned apart from each other. Lymphadenopathy:     She has no cervical adenopathy. Neurological: She is alert and oriented to person, place, and time. Skin: Skin is warm and dry. No rash noted. No erythema. No pallor. Psychiatric: She has a normal mood and affect.  Her behavior is normal. Thought content normal. Nursing note and vitals reviewed. Diagnostic Study Results     Labs -     Recent Results (from the past 12 hour(s))   METABOLIC PANEL, COMPREHENSIVE    Collection Time: 08/10/19  7:55 PM   Result Value Ref Range    Sodium 139 136 - 145 mmol/L    Potassium 3.4 (L) 3.5 - 5.5 mmol/L    Chloride 108 100 - 111 mmol/L    CO2 23 21 - 32 mmol/L    Anion gap 8 3.0 - 18 mmol/L    Glucose 85 74 - 99 mg/dL    BUN 12 7.0 - 18 MG/DL    Creatinine 0.79 0.6 - 1.3 MG/DL    BUN/Creatinine ratio 15 12 - 20      GFR est AA >60 >60 ml/min/1.73m2    GFR est non-AA >60 >60 ml/min/1.73m2    Calcium 9.3 8.5 - 10.1 MG/DL    Bilirubin, total 0.5 0.2 - 1.0 MG/DL    ALT (SGPT) 16 13 - 56 U/L    AST (SGOT) 12 10 - 38 U/L    Alk. phosphatase 38 (L) 45 - 117 U/L    Protein, total 8.2 6.4 - 8.2 g/dL    Albumin 4.0 3.4 - 5.0 g/dL    Globulin 4.2 (H) 2.0 - 4.0 g/dL    A-G Ratio 1.0 0.8 - 1.7     LIPASE    Collection Time: 08/10/19  7:55 PM   Result Value Ref Range    Lipase 76 73 - 393 U/L   CBC WITH AUTOMATED DIFF    Collection Time: 08/10/19  7:55 PM   Result Value Ref Range    WBC 8.2 4.6 - 13.2 K/uL    RBC 4.88 4.20 - 5.30 M/uL    HGB 12.8 12.0 - 16.0 g/dL    HCT 37.9 35.0 - 45.0 %    MCV 77.7 74.0 - 97.0 FL    MCH 26.2 24.0 - 34.0 PG    MCHC 33.8 31.0 - 37.0 g/dL    RDW 13.5 11.6 - 14.5 %    PLATELET 368 680 - 875 K/uL    MPV 9.4 9.2 - 11.8 FL    NEUTROPHILS 86 (H) 40 - 73 %    LYMPHOCYTES 11 (L) 21 - 52 %    MONOCYTES 3 3 - 10 %    EOSINOPHILS 0 0 - 5 %    BASOPHILS 0 0 - 2 %    ABS. NEUTROPHILS 7.0 1.8 - 8.0 K/UL    ABS. LYMPHOCYTES 0.9 0.9 - 3.6 K/UL    ABS. MONOCYTES 0.3 0.05 - 1.2 K/UL    ABS. EOSINOPHILS 0.0 0.0 - 0.4 K/UL    ABS.  BASOPHILS 0.0 0.0 - 0.1 K/UL    DF AUTOMATED     HCG QL SERUM    Collection Time: 08/10/19  7:55 PM   Result Value Ref Range    HCG, Ql. NEGATIVE  NEG     MAGNESIUM    Collection Time: 08/10/19  7:55 PM   Result Value Ref Range    Magnesium 1.9 1.6 - 2.6 mg/dL   GLUCOSE, POC    Collection Time: 08/10/19  8:44 PM   Result Value Ref Range    Glucose (POC) 91 70 - 110 mg/dL       Radiologic Studies -   No orders to display     CT Results  (Last 48 hours)    None        CXR Results  (Last 48 hours)    None            Medical Decision Making   I am the first provider for this patient. I reviewed the vital signs, available nursing notes, past medical history, past surgical history, family history and social history. Vital Signs-Reviewed the patient's vital signs. Procedures:  Procedures    Provider Notes (Medical Decision Making): Give IV fluids replaced her potassium and gave her Versed which is completely resolved her symptoms. We will send her home with some Skelaxin and ibuprofen. She can follow-up with her GYN. Has having no pain now. Is not anemic vital signs stable. MED RECONCILIATION:  Current Facility-Administered Medications   Medication Dose Route Frequency    0.9% sodium chloride infusion  100 mL/hr IntraVENous CONTINUOUS     Current Outpatient Medications   Medication Sig    ibuprofen (MOTRIN) 800 mg tablet Take 1 Tab by mouth every eight (8) hours for 5 days.  metaxalone (SKELAXIN) 800 mg tablet Take 1 Tab by mouth four (4) times daily for 5 days.  norethindrone-ethinyl estradiol (JUNEL FE 1/20) 1 mg-20 mcg (21)/75 mg (7) tab Take 1 Tab by mouth daily.  ibuprofen (MOTRIN) 800 mg tablet Take  by mouth. Disposition:  home    DISCHARGE NOTE:   10:52 PM    Pt has been reexamined. Patient has no new complaints, changes, or physical findings. Care plan outlined and precautions discussed. Results of labs were reviewed with the patient. All medications were reviewed with the patient; will d/c home with ibuprofen, skelaxin. All of pt's questions and concerns were addressed. Patient was instructed and agrees to follow up with GYN, as well as to return to the ED upon further deterioration. Patient is ready to go home.     Follow-up Information     Follow up With Specialties Details Why Contact Rama Albright MD Obstetrics & Gynecology Schedule an appointment as soon as possible for a visit in 2 days  [id number] [facility] [place]  [address] [facility] [place] [address] DEPT Emergency Medicine  If symptoms worsen return immediately [address]  [phone]          Current Discharge Medication List      START taking these medications    Details   !! ibuprofen (MOTRIN) 800 mg tablet Take 1 Tab by mouth every eight (8) hours for 5 days. Qty: 15 Tab, Refills: 0      metaxalone (SKELAXIN) 800 mg tablet Take 1 Tab by mouth four (4) times daily for 5 days. Qty: 20 Tab, Refills: 0       !! - Potential duplicate medications found. Please discuss with provider. CONTINUE these medications which have NOT CHANGED    Details   !! ibuprofen (MOTRIN) 800 mg tablet Take  by mouth. !! - Potential duplicate medications found. Please discuss with provider. Diagnosis     Clinical Impression:   1. Muscle cramps    2.  Hypokalemia

## 2019-08-11 NOTE — ED NOTES
Pt reports pain all over her body. Wrists turned in - pt states she is unable to straighten them. Reports pins and needles sensation to her hands.

## 2019-08-11 NOTE — ED NOTES
Ambulatory to the restroom with assistance. Pt's hands do not appear as tight/turned in. Reports slight improvement in tingling sensation. Reports slight improvement to pelvic pain.

## 2019-08-11 NOTE — DISCHARGE INSTRUCTIONS
Patient Education        Muscle Cramps: Care Instructions  Your Care Instructions    A muscle cramp occurs when a muscle tightens up suddenly. A cramp often happens in the legs. A muscle cramp is also called a muscle spasm or a charley horse. Muscle cramps usually last less than a minute. However, the pain may last for several minutes. Leg cramps that occur at night may wake you up. Heavy exercise, dehydration, and being overweight can increase your risk of getting cramps. An imbalance of certain chemicals in your blood, called electrolytes, can also lead to muscle cramps. Pregnant women sometimes get muscle cramps during sleep. Muscle cramps can be treated by stretching and massaging the muscle. If cramps keep coming back, your doctor may prescribe medicine that relaxes your muscles. Follow-up care is a key part of your treatment and safety. Be sure to make and go to all appointments, and call your doctor if you are having problems. It's also a good idea to know your test results and keep a list of the medicines you take. How can you care for yourself at home? · Drink plenty of fluids to prevent dehydration. Choose water and other caffeine-free clear liquids until you feel better. If you have kidney, heart, or liver disease and have to limit fluids, talk with your doctor before you increase the amount of fluids you drink. · Stretch your muscles every day, especially before and after exercise and at bedtime. Regular stretching can relax your muscles and may prevent cramps. · Do not suddenly increase the amount of exercise you get. Increase your exercise a little each week. · When you get a cramp, stretch and massage the muscle. You can also take a warm shower or bath to relax the muscle. A heating pad placed on the muscle can also help. · Take a daily multivitamin supplement.   · Ask your doctor if you can take an over-the-counter pain medicine, such as acetaminophen (Tylenol), ibuprofen (Advil, Motrin), or naproxen (Aleve). Be safe with medicines. Read and follow all instructions on the label. When should you call for help? Watch closely for changes in your health, and be sure to contact your doctor if:    · You get muscle cramps often that do not go away after home treatment.     · Your muscle cramps often wake you up at night.     · You do not get better as expected. Where can you learn more? Go to http://rebeka-deep.info/. Enter V920 in the search box to learn more about \"Muscle Cramps: Care Instructions. \"  Current as of: September 20, 2018  Content Version: 12.1  © 9034-3645 [employer], [employer]. Care instructions adapted under license by [employer] (which disclaims liability or warranty for this information). If you have questions about a medical condition or this instruction, always ask your healthcare professional. [address] any warranty or liability for your use of this information.

## 2019-09-04 ENCOUNTER — OFFICE VISIT (OUTPATIENT)
Dept: OBGYN CLINIC | Age: 24
End: 2019-09-04

## 2019-09-04 ENCOUNTER — HOSPITAL ENCOUNTER (OUTPATIENT)
Dept: LAB | Age: 24
Discharge: HOME | End: 2019-09-04
Payer: COMMERCIAL

## 2019-09-04 VITALS
DIASTOLIC BLOOD PRESSURE: 78 MMHG | HEIGHT: 64 IN | HEART RATE: 88 BPM | SYSTOLIC BLOOD PRESSURE: 121 MMHG | TEMPERATURE: 99 F | RESPIRATION RATE: 18 BRPM | BODY MASS INDEX: 26.8 KG/M2 | WEIGHT: 157 LBS

## 2019-09-04 DIAGNOSIS — N76.0 ACUTE VAGINITIS: ICD-10-CM

## 2019-09-04 DIAGNOSIS — Z30.09 ENCOUNTER FOR COUNSELING REGARDING CONTRACEPTION: ICD-10-CM

## 2019-09-04 DIAGNOSIS — R10.2 PELVIC PAIN IN FEMALE: Primary | ICD-10-CM

## 2019-09-04 DIAGNOSIS — R10.2 PELVIC PAIN IN FEMALE: ICD-10-CM

## 2019-09-04 DIAGNOSIS — Z30.09 FAMILY PLANNING: ICD-10-CM

## 2019-09-04 LAB — WET MOUNT POCT, WMPOCT: NORMAL

## 2019-09-04 PROCEDURE — 87491 CHLMYD TRACH DNA AMP PROBE: CPT

## 2019-09-04 NOTE — PROGRESS NOTES
Subjective:   20 y.o. female complains of brown vaginal discharge for 2-3 weeks. She also reports having gone to the emergency room last month for increasingly severe, debilitating menstrual cramps. She reports her brown discharge starting irregularly after missing approximately 2 OCPs the prior month. She denies vaginal odor or irritation. She denies pelvic pain currently. She also desires a change in contraception. Patient's last menstrual period was 08/10/2019 (exact date). Objective:   She appears well, afebrile. Abdomen: benign, soft, nontender, no masses. Pelvic Exam: VULVA: normal appearing vulva with no masses, tenderness or lesions, VAGINA: normal appearing vagina with normal color and discharge, no lesions, CERVIX: cervix with dark, old blood noted at external os; +CMT on exam, UTERUS: uterus is normal size, shape, consistency and nontender, ADNEXA: normal adnexa in size, nontender and no masses. Microscopic wet-mount exam shows negative for pathogens, normal epithelial cells, no WBCs noted. .    Assessment/Plan:   no pathogens identified causing these symptoms  GC and chlamydia DNA  probe sent to lab. Treatment: see orders for additional treatments  ROV prn if symptoms persist or worsen. ICD-10-CM ICD-9-CM    1. Pelvic pain in female R10.2 625.9 US PELV NON OB W TV      CHLAMYDIA/NEISSERIA/TRICHOMONAS AMP   2. Acute vaginitis N76.0 616.10 AMB POC SMEAR, STAIN & INTERPRET, WET MOUNT      CHLAMYDIA/NEISSERIA/TRICHOMONAS AMP   3. Family planning Z30.09 V25.09 norelgestromin-ethinyl estradiol (ORTHO EVRA) 150-35 mcg/24 hr   4. Encounter for counseling regarding contraception Z30.09 V25.09      Encounter Diagnoses   Name Primary?     Pelvic pain in female Yes    Acute vaginitis     Family planning     Encounter for counseling regarding contraception      Orders Placed This Encounter    US PELV NON OB W TV    CHLAMYDIA/NEISSERIA/TRICHOMONAS AMP    AMB POC SMEAR, STAIN & INTERPRET, WET MOUNT    norelgestromin-ethinyl estradiol (ORTHO EVRA) 150-35 mcg/24 hr     Follow-up and Dispositions    · Return in about 2 weeks (around 9/18/2019) for Ultrasound Follow-up. Mookie Rosenberg

## 2019-09-04 NOTE — PROGRESS NOTES
.  Chief Complaint   Patient presents with    Menstrual Problem     cramps    Pelvic Pain     ? ovarian cyst left side

## 2019-09-06 LAB
C TRACH RRNA SPEC QL NAA+PROBE: NEGATIVE
N GONORRHOEA RRNA SPEC QL NAA+PROBE: NEGATIVE
SPECIMEN SOURCE: NORMAL
T VAGINALIS RRNA SPEC QL NAA+PROBE: NEGATIVE

## 2019-12-16 ENCOUNTER — OFFICE VISIT (OUTPATIENT)
Dept: OBGYN CLINIC | Age: 24
End: 2019-12-16

## 2019-12-16 VITALS
TEMPERATURE: 97.3 F | DIASTOLIC BLOOD PRESSURE: 80 MMHG | BODY MASS INDEX: 27.31 KG/M2 | WEIGHT: 160 LBS | HEART RATE: 80 BPM | HEIGHT: 64 IN | RESPIRATION RATE: 17 BRPM | SYSTOLIC BLOOD PRESSURE: 115 MMHG

## 2019-12-16 DIAGNOSIS — Z30.09 FAMILY PLANNING: Primary | ICD-10-CM

## 2019-12-16 LAB
HCG URINE, QL. (POC): NEGATIVE
VALID INTERNAL CONTROL?: YES

## 2019-12-16 RX ORDER — NORETHINDRONE ACETATE AND ETHINYL ESTRADIOL 1MG-20(21)
1 KIT ORAL DAILY
Qty: 3 PACKAGE | Refills: 0 | Status: SHIPPED | OUTPATIENT
Start: 2019-12-16 | End: 2019-12-16 | Stop reason: ALTCHOICE

## 2019-12-16 NOTE — PROGRESS NOTES
Subjective:   Taniya James is a 30 y.o. female who presents for contraception counseling. The patient has no complaints today. She has been using the birth control patches without issue and would like to continue. The patient is sexually active. Social History: single partner, contraception - Ortho-Evra patches weekly. Pertinent past medical hstory: no history of HTN, DVT, CAD, DM, liver disease, migraines or smoking. There is no problem list on file for this patient. There are no active problems to display for this patient. Current Outpatient Medications   Medication Sig Dispense Refill    [START ON 12/21/2019] norelgestromin-ethinyl estradiol (ORTHO EVRA) 150-35 mcg/24 hr 1 Patch by TransDERmal route Every Saturday. 9 Patch 0    ibuprofen (MOTRIN) 800 mg tablet Take  by mouth. Allergies   Allergen Reactions    Shellfish Derived Hives    Shellfish Derived Swelling     Pt has facial swelling     Past Medical History:   Diagnosis Date    Ovarian cyst      History reviewed. No pertinent surgical history. History reviewed. No pertinent family history. Social History     Tobacco Use    Smoking status: Never Smoker    Smokeless tobacco: Never Used   Substance Use Topics    Alcohol use: Yes     Comment: socially        GYN Review: normal menses, no abnormal bleeding, pelvic pain or discharge, no breast pain or new or enlarging lumps on self exam    Additional Review of Systems  Pertinent items are noted in HPI. Objective:     Visit Vitals  /80   Pulse 80   Temp 97.3 °F (36.3 °C) (Oral)   Resp 17   Ht 5' 4\" (1.626 m)   Wt 160 lb (72.6 kg)   LMP 12/01/2019 (Exact Date)   BMI 27.46 kg/m²     The patient appears well, alert, oriented x 3, in no distress. Assessment/Plan:     30y.o. year old, continuing Ortho-Evra patches weekly, no contraindications. return annually or prn    ICD-10-CM ICD-9-CM    1.  Family planning Z30.09 V25.09 AMB POC URINE PREGNANCY TEST, VISUAL COLOR COMPARISON      norelgestromin-ethinyl estradiol (ORTHO EVRA) 150-35 mcg/24 hr      DISCONTINUED: norethindrone-ethinyl estradiol (JUNEL FE 1/20) 1 mg-20 mcg (21)/75 mg (7) tab     Encounter Diagnoses   Name Primary?  Family planning Yes     Orders Placed This Encounter    AMB POC URINE PREGNANCY TEST, VISUAL COLOR COMPARISON    DISCONTD: norethindrone-ethinyl estradiol (JUNEL FE 1/20) 1 mg-20 mcg (21)/75 mg (7) tab    norelgestromin-ethinyl estradiol (ORTHO EVRA) 150-35 mcg/24 hr     Follow-up and Dispositions    · Return in about 3 months (around 3/16/2020) for Annual Exam or prn. Ximena Pham

## 2020-03-18 ENCOUNTER — HOSPITAL ENCOUNTER (OUTPATIENT)
Dept: LAB | Age: 25
Discharge: HOME | End: 2020-03-18
Payer: COMMERCIAL

## 2020-03-18 ENCOUNTER — OFFICE VISIT (OUTPATIENT)
Dept: OBGYN CLINIC | Age: 25
End: 2020-03-18

## 2020-03-18 VITALS
BODY MASS INDEX: 28 KG/M2 | DIASTOLIC BLOOD PRESSURE: 69 MMHG | HEIGHT: 64 IN | HEART RATE: 84 BPM | WEIGHT: 164 LBS | SYSTOLIC BLOOD PRESSURE: 117 MMHG

## 2020-03-18 DIAGNOSIS — Z11.3 ROUTINE SCREENING FOR STI (SEXUALLY TRANSMITTED INFECTION): ICD-10-CM

## 2020-03-18 DIAGNOSIS — Z01.419 WELL WOMAN EXAM WITH ROUTINE GYNECOLOGICAL EXAM: Primary | ICD-10-CM

## 2020-03-18 DIAGNOSIS — Z30.09 FAMILY PLANNING: ICD-10-CM

## 2020-03-18 DIAGNOSIS — Z01.419 WELL WOMAN EXAM WITH ROUTINE GYNECOLOGICAL EXAM: ICD-10-CM

## 2020-03-18 LAB
HCG URINE, QL. (POC): NEGATIVE
VALID INTERNAL CONTROL?: YES

## 2020-03-18 PROCEDURE — 86780 TREPONEMA PALLIDUM: CPT

## 2020-03-18 PROCEDURE — 86803 HEPATITIS C AB TEST: CPT

## 2020-03-18 PROCEDURE — 87661 TRICHOMONAS VAGINALIS AMPLIF: CPT

## 2020-03-18 PROCEDURE — 87340 HEPATITIS B SURFACE AG IA: CPT

## 2020-03-18 PROCEDURE — 87389 HIV-1 AG W/HIV-1&-2 AB AG IA: CPT

## 2020-03-18 NOTE — PROGRESS NOTES
Subjective:   30 y.o. female for Well Woman Check. She is without complaints. She desires STI screening and would like to continue her birth control patches. Patient's last menstrual period was 02/07/2020. Social History: not sexually active. Pertinent past medical hstory: no history of HTN, DVT, CAD, DM, liver disease, migraines or smoking. There is no problem list on file for this patient. There are no active problems to display for this patient. Current Outpatient Medications   Medication Sig Dispense Refill    [START ON 3/21/2020] norelgestromin-ethinyl estradiol (ORTHO EVRA) 150-35 mcg/24 hr 1 Patch by TransDERmal route Every Saturday. 9 Patch 3    norelgestromin-ethinyl estradiol (ORTHO EVRA) 150-35 mcg/24 hr 1 Patch by TransDERmal route Every Saturday. 9 Patch 0    ibuprofen (MOTRIN) 800 mg tablet Take  by mouth. Allergies   Allergen Reactions    Shellfish Derived Hives    Shellfish Derived Swelling     Pt has facial swelling     Past Medical History:   Diagnosis Date    Ovarian cyst      History reviewed. No pertinent surgical history. Family History   Problem Relation Age of Onset    Heart Disease Mother      Social History     Tobacco Use    Smoking status: Never Smoker    Smokeless tobacco: Never Used   Substance Use Topics    Alcohol use: Yes     Comment: socially        ROS:  Feeling well. No dyspnea or chest pain on exertion. No abdominal pain, change in bowel habits, black or bloody stools. No urinary tract symptoms. GYN ROS: normal menses, no abnormal bleeding, pelvic pain or discharge, no breast pain or new or enlarging lumps on self exam. No neurological complaints. Objective:     Visit Vitals  /69   Pulse 84   Ht 5' 4\" (1.626 m)   Wt 164 lb (74.4 kg)   LMP 02/07/2020   BMI 28.15 kg/m²     The patient appears well, alert, oriented x 3, in no distress. ENT normal.  Neck supple. No adenopathy or thyromegaly. DIANA.  Lungs are clear, good air entry, no wheezes, rhonchi or rales. S1 and S2 normal, no murmurs, regular rate and rhythm. Abdomen soft without tenderness, guarding, mass or organomegaly. Extremities show no edema, normal peripheral pulses. Neurological is normal, no focal findings. BREAST EXAM: breasts appear normal, no suspicious masses, no skin or nipple changes or axillary nodes    PELVIC EXAM: normal external genitalia, vulva, vagina, cervix, uterus and adnexa    Assessment/Plan:   well woman  no contraindication to continue hormonal therapy  counseled on breast self exam  return annually or prn  Pap smear in March 2019 normal. Next pap not needed until 2022. ICD-10-CM ICD-9-CM    1. Well woman exam with routine gynecological exam Z01.419 V72.31 T PALLIDUM AB      HEP B SURFACE AG      HEPATITIS C AB      HIV 1/2 AG/AB, 4TH GENERATION,W RFLX CONFIRM      norelgestromin-ethinyl estradiol (ORTHO EVRA) 150-35 mcg/24 hr      AMB POC URINE PREGNANCY TEST, VISUAL COLOR COMPARISON      CHLAMYDIA/NEISSERIA/TRICHOMONAS AMP   2. Family planning Z30.09 V25.09    3. Routine screening for STI (sexually transmitted infection) Z11.3 V74.5 T PALLIDUM AB      HEP B SURFACE AG      HEPATITIS C AB      HIV 1/2 AG/AB, 4TH GENERATION,W RFLX CONFIRM      norelgestromin-ethinyl estradiol (ORTHO EVRA) 150-35 mcg/24 hr      AMB POC URINE PREGNANCY TEST, VISUAL COLOR COMPARISON      CHLAMYDIA/NEISSERIA/TRICHOMONAS AMP     Encounter Diagnoses   Name Primary?     Well woman exam with routine gynecological exam Yes    Family planning     Routine screening for STI (sexually transmitted infection)      Orders Placed This Encounter    T PALLIDUM AB    HEP B SURFACE AG    HEPATITIS C AB    HIV 1/2 AG/AB, 4TH GENERATION,W RFLX CONFIRM    CHLAMYDIA/NEISSERIA/TRICHOMONAS AMP    AMB POC URINE PREGNANCY TEST, VISUAL COLOR COMPARISON    norelgestromin-ethinyl estradiol (ORTHO EVRA) 150-35 mcg/24 hr     Follow-up and Dispositions    · Return in about 1 year (around 3/18/2021), or if symptoms worsen or fail to improve, for Annual Exam or prn. Mliss Aguirre

## 2020-03-19 LAB
HBV SURFACE AG SER QL: <0.1 INDEX
HBV SURFACE AG SER QL: NEGATIVE
HCV AB SER IA-ACNC: 0.13 INDEX
HCV AB SERPL QL IA: NEGATIVE
HCV COMMENT,HCGAC: NORMAL
T PALLIDUM AB SER QL IA: NONREACTIVE

## 2020-03-20 LAB
C TRACH RRNA SPEC QL NAA+PROBE: NEGATIVE
HIV 1+2 AB+HIV1 P24 AG SERPL QL IA: NONREACTIVE
HIV12 RESULT COMMENT, HHIVC: NORMAL
N GONORRHOEA RRNA SPEC QL NAA+PROBE: NEGATIVE
SPECIMEN SOURCE: NORMAL
T VAGINALIS RRNA SPEC QL NAA+PROBE: NEGATIVE

## 2024-08-29 ENCOUNTER — TRANSCRIBE ORDERS (OUTPATIENT)
Facility: HOSPITAL | Age: 29
End: 2024-08-29

## 2024-08-29 ENCOUNTER — HOSPITAL ENCOUNTER (OUTPATIENT)
Facility: HOSPITAL | Age: 29
Discharge: HOME | End: 2024-08-29
Payer: COMMERCIAL

## 2024-08-29 DIAGNOSIS — K59.04 CHRONIC IDIOPATHIC CONSTIPATION: ICD-10-CM

## 2024-08-29 DIAGNOSIS — Z11.59 SCREENING EXAMINATION FOR POLIOMYELITIS: ICD-10-CM

## 2024-08-29 DIAGNOSIS — Z11.4 SCREENING FOR HUMAN IMMUNODEFICIENCY VIRUS: Primary | ICD-10-CM

## 2024-08-29 DIAGNOSIS — Z11.4 SCREENING FOR HUMAN IMMUNODEFICIENCY VIRUS: ICD-10-CM

## 2024-08-29 LAB
ANION GAP SERPL CALC-SCNC: 6 MMOL/L (ref 3–18)
BUN SERPL-MCNC: 15 MG/DL (ref 7–18)
BUN/CREAT SERPL: 19 (ref 12–20)
CA-I SERPL-SCNC: 1.31 MMOL/L (ref 1.15–1.33)
CALCIUM SERPL-MCNC: 9.6 MG/DL (ref 8.5–10.1)
CHLORIDE SERPL-SCNC: 105 MMOL/L (ref 100–111)
CO2 SERPL-SCNC: 26 MMOL/L (ref 21–32)
CREAT SERPL-MCNC: 0.8 MG/DL (ref 0.6–1.3)
EST. AVERAGE GLUCOSE BLD GHB EST-MCNC: 114 MG/DL
GLUCOSE SERPL-MCNC: 88 MG/DL (ref 74–99)
HBA1C MFR BLD: 5.6 % (ref 4.2–5.6)
POTASSIUM SERPL-SCNC: 4.6 MMOL/L (ref 3.5–5.5)
SODIUM SERPL-SCNC: 137 MMOL/L (ref 136–145)

## 2024-08-29 PROCEDURE — 36415 COLL VENOUS BLD VENIPUNCTURE: CPT

## 2024-08-29 PROCEDURE — 87389 HIV-1 AG W/HIV-1&-2 AB AG IA: CPT

## 2024-08-29 PROCEDURE — 80048 BASIC METABOLIC PNL TOTAL CA: CPT

## 2024-08-29 PROCEDURE — 86803 HEPATITIS C AB TEST: CPT

## 2024-08-29 PROCEDURE — 83036 HEMOGLOBIN GLYCOSYLATED A1C: CPT

## 2024-08-29 PROCEDURE — 82330 ASSAY OF CALCIUM: CPT

## 2024-08-29 PROCEDURE — 84443 ASSAY THYROID STIM HORMONE: CPT

## 2024-08-30 LAB
HCV AB SER IA-ACNC: 0.38 INDEX
HCV AB SERPL QL IA: NEGATIVE
HEPATITIS C COMMENT: NORMAL
HIV 1+2 AB+HIV1 P24 AG SERPL QL IA: NONREACTIVE
HIV 1/2 RESULT COMMENT: NORMAL
TSH SERPL DL<=0.05 MIU/L-ACNC: 1.41 UIU/ML (ref 0.45–4.5)